# Patient Record
(demographics unavailable — no encounter records)

---

## 2025-02-05 NOTE — PLAN
[TextEntry] : Recommend exclusive breastfeeding, 8-12 feedings per day. Continue Vitamin D supplement. Mother should continue prenatal vitamins and avoid alcohol. If formula is needed, recommend iron-fortified formulations, 2-4 oz every 2-3 hrs. Recommend no screen time this young. Can start observed short periods of tummy time once umbilical stump is off and healed. When in car, patient should be in rear-facing car seat in back seat, and do not leave baby in car alone for any reason or time. Put baby to sleep on back, in own crib with no loose or soft bedding, do not co-sleep with baby. Help baby to develop sleep and feeding routines. Limit baby's exposure to others, especially those with fever or unknown vaccine status. Avoid all screen time. Caregivers counseled about caregiver and family vaccination (tdap, flu). To protect baby, prevent large crowds, extreme hot or cold environments, avoid overdressing/overheating baby, and avoid direct sun. Parents counseled to call if rectal temperature >100.4 degrees F. Do not give baby extra water. Wash hands often especially after changing diapers.     Return for weight check in 3 days, or sooner if questions or concerns arise.

## 2025-02-05 NOTE — HISTORY OF PRESENT ILLNESS
[Born at ___ Wks Gestation] : The patient was born at [unfilled] weeks gestation [] : via normal spontaneous vaginal delivery [[place]] : [facility] [BW: _____] : weight of [unfilled] [Length: _____] : length of [unfilled] [HC: _____] : head circumference of [unfilled] [DW: _____] : Discharge weight was [unfilled] [Hepatitis B Vaccine Given] : Hepatitis B vaccine given [Nirsevimab Given] : Nirsevimab given  [(1) _____] : [unfilled] [(5) _____] : [unfilled] [Age: ___] : [unfilled] year old mother [G: ___] : G [unfilled] [P: ___] : P [unfilled] [RSV vaccine] : RSV vaccine received by mother at least 14 days prior to delivery [None] : There are no risk factors [Other: ____] : [unfilled] [TcB: _____] : Transcutaneous Bilirubin [unfilled] mg/dL [Breast milk] : breast milk [Normal] : Normal [___ voids per day] : [unfilled] voids per day [Frequency of stools: ___] : Frequency of stools: [unfilled]  stools [Yellow] : yellow [Seedy] : seedy [In Bassinet/Crib] : sleeps in bassinet/crib [On back] : sleeps on back [Loose bedding, pillow, toys, and/or bumpers in crib] : loose bedding, pillow, toys, and/or bumpers in crib [Pacifier] : Uses pacifier [No] : No cigarette smoke exposure [Water heater temperature set at <120 degrees F] : Water heater temperature set at <120 degrees F [Rear facing car seat in back seat] : Rear facing car seat in back seat [Carbon Monoxide Detectors] : Carbon monoxide detectors at home [Smoke Detectors] : Smoke detectors at home. 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

## 2025-02-05 NOTE — PHYSICAL EXAM
3 [Alert] : alert [Acute Distress] : no acute distress [Normocephalic] : normocephalic [Flat Open Anterior [place]] : flat open anterior fontanelle [Icteric sclera] : nonicteric sclera [PERRL] : PERRL [Red Reflex Bilateral] : red reflex bilateral [Normally Placed Ears] : normally placed ears [Auricles Well Formed] : auricles well formed [Clear Tympanic membranes] : clear tympanic membranes [Light reflex present] : light reflex present [Bony structures visible] : bony structures visible [Patent Auditory Canal] : patent auditory canal [Discharge] : no discharge [Nares Patent] : nares patent [Palate Intact] : palate intact [Uvula Midline] : uvula midline [Supple, full passive range of motion] : supple, full passive range of motion [Palpable Masses] : no palpable masses [Symmetric Chest Rise] : symmetric chest rise [Clear to Auscultation Bilaterally] : clear to auscultation bilaterally [Regular Rate and Rhythm] : regular rate and rhythm [S1, S2 present] : S1, S2 present [Murmurs] : no murmurs [+2 Femoral Pulses] : +2 femoral pulses [Soft] : soft [Tender] : nontender [Distended] : not distended [Bowel Sounds] : bowel sounds present [Umbilical Stump Dry, Clean, Intact] : umbilical stump dry, clean, intact [Hepatomegaly] : no hepatomegaly [Splenomegaly] : no splenomegaly [Normal external genitalia] : normal external genitalia [Clitoromegaly] : no clitoromegaly [Patent Vagina] : patent vagina [Patent] : patent [Normally Placed] : normally placed [No Abnormal Lymph Nodes Palpated] : no abnormal lymph nodes palpated [Page-Ortolani] : negative Page-Ortolani [Symmetric Flexed Extremities] : symmetric flexed extremities [Spinal Dimple] : no spinal dimple [Tuft of Hair] : no tuft of hair [Startle Reflex] : startle reflex present [Suck Reflex] : suck reflex present [Rooting] : rooting reflex present [Palmar Grasp] : palmar grasp present [Plantar Grasp] : plantar reflex present [Symmetric Tess] : symmetric [place] [de-identified] : mild jaundice

## 2025-02-05 NOTE — PHYSICAL EXAM

## 2025-02-05 NOTE — HISTORY OF PRESENT ILLNESS
[Born at ___ Wks Gestation] : The patient was born at [unfilled] weeks gestation [] : via normal spontaneous vaginal delivery [[place]] : [facility] [BW: _____] : weight of [unfilled] [Length: _____] : length of [unfilled] [HC: _____] : head circumference of [unfilled] [DW: _____] : Discharge weight was [unfilled] [Hepatitis B Vaccine Given] : Hepatitis B vaccine given [Nirsevimab Given] : Nirsevimab given  [(1) _____] : [unfilled] [(5) _____] : [unfilled] [Age: ___] : [unfilled] year old mother [G: ___] : G [unfilled] [P: ___] : P [unfilled] [RSV vaccine] : RSV vaccine received by mother at least 14 days prior to delivery [None] : There are no risk factors [Other: ____] : [unfilled] [TcB: _____] : Transcutaneous Bilirubin [unfilled] mg/dL [Breast milk] : breast milk [Normal] : Normal [___ voids per day] : [unfilled] voids per day [Frequency of stools: ___] : Frequency of stools: [unfilled]  stools [Yellow] : yellow [Seedy] : seedy [In Bassinet/Crib] : sleeps in bassinet/crib [On back] : sleeps on back [Loose bedding, pillow, toys, and/or bumpers in crib] : loose bedding, pillow, toys, and/or bumpers in crib [Pacifier] : Uses pacifier [No] : No cigarette smoke exposure [Water heater temperature set at <120 degrees F] : Water heater temperature set at <120 degrees F [Rear facing car seat in back seat] : Rear facing car seat in back seat [Carbon Monoxide Detectors] : Carbon monoxide detectors at home [Smoke Detectors] : Smoke detectors at home. 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

## 2025-02-08 NOTE — HISTORY OF PRESENT ILLNESS
[de-identified] : wt and rita check [FreeTextEntry6] : Doing well at home Feeding well - BF well Q2-3 x 20-25 minutes tries to get both breasts per feeding Sleeping well Adequate BMS, yellow and seedy Urinating well Jaundice looks better

## 2025-02-08 NOTE — DISCUSSION/SUMMARY
[FreeTextEntry1] : Reassured stable weight gain and improved jaundice - down from 12 to 7 Feeding discussed  Continue present care and feeding Hand washing and infection control discussed Routine  care Next visit at 1 month [facility]

## 2025-02-13 NOTE — HISTORY OF PRESENT ILLNESS
[de-identified] : per parents here for follow up for umbilical cord, still has fluid and now dried blood [FreeTextEntry6] : Umbilical stump fell off on 2/7 - still does not look dried and has noticed 2 times with dried blood No fever No redness around belly buttton BF well Q2-3  Adequate BMS, yellow and seedy Urinating well

## 2025-02-13 NOTE — DISCUSSION/SUMMARY
[FreeTextEntry1] : Therapy: silver nitrate to umbilical stump Keep dry x few days and can bath then if appears healed Reassured stable weight gain Recheck as needed if parents notice persistent discharge or bleeding